# Patient Record
Sex: FEMALE | Race: WHITE | NOT HISPANIC OR LATINO | URBAN - NONMETROPOLITAN AREA
[De-identification: names, ages, dates, MRNs, and addresses within clinical notes are randomized per-mention and may not be internally consistent; named-entity substitution may affect disease eponyms.]

---

## 2019-10-05 ENCOUNTER — IMPORTED ENCOUNTER (OUTPATIENT)
Dept: URBAN - NONMETROPOLITAN AREA CLINIC 1 | Facility: CLINIC | Age: 79
End: 2019-10-05

## 2019-10-05 PROCEDURE — 99203 OFFICE O/P NEW LOW 30 MIN: CPT

## 2019-10-05 NOTE — PATIENT DISCUSSION
Herpes Zoster Keratitis OD- Recommend continue Valcyclovir without chnage and begin Viroptic 1 gtt TID OD- Recommend follow-up in 3 days.

## 2019-10-08 ENCOUNTER — IMPORTED ENCOUNTER (OUTPATIENT)
Dept: URBAN - NONMETROPOLITAN AREA CLINIC 1 | Facility: CLINIC | Age: 79
End: 2019-10-08

## 2019-10-08 PROBLEM — B02.33: Noted: 2019-10-08

## 2019-10-08 PROCEDURE — 99212 OFFICE O/P EST SF 10 MIN: CPT

## 2019-10-08 NOTE — PATIENT DISCUSSION
Herpes Zoster Keratitis OD- Slight improvement from from saturday exam recommend continue valcyclovir  1 PO TID and Viroptic 1 gtt q 4hrs. - Recommend follow-up in 2 days.

## 2019-10-10 ENCOUNTER — IMPORTED ENCOUNTER (OUTPATIENT)
Dept: URBAN - NONMETROPOLITAN AREA CLINIC 1 | Facility: CLINIC | Age: 79
End: 2019-10-10

## 2019-10-10 PROCEDURE — 99212 OFFICE O/P EST SF 10 MIN: CPT

## 2019-10-10 NOTE — PATIENT DISCUSSION
Herpes Zoster Keratitis OD- Recommend continue Valcyclovir without chnage and Viroptic 1 gtt TID OD- Recommend follow-up in 1 week.

## 2019-10-16 ENCOUNTER — IMPORTED ENCOUNTER (OUTPATIENT)
Dept: URBAN - NONMETROPOLITAN AREA CLINIC 1 | Facility: CLINIC | Age: 79
End: 2019-10-16

## 2019-10-16 PROCEDURE — 99212 OFFICE O/P EST SF 10 MIN: CPT

## 2019-10-30 ENCOUNTER — IMPORTED ENCOUNTER (OUTPATIENT)
Dept: URBAN - NONMETROPOLITAN AREA CLINIC 1 | Facility: CLINIC | Age: 79
End: 2019-10-30

## 2019-10-30 PROCEDURE — 99212 OFFICE O/P EST SF 10 MIN: CPT

## 2019-10-30 NOTE — PATIENT DISCUSSION
Herpes Zoster Keratitis OD- Recommend continue Valcyclovir without change and continue Viroptic 1 gtt TID OD.- Recommend follow-up in 3 weeks.

## 2019-11-20 ENCOUNTER — IMPORTED ENCOUNTER (OUTPATIENT)
Dept: URBAN - NONMETROPOLITAN AREA CLINIC 1 | Facility: CLINIC | Age: 79
End: 2019-11-20

## 2019-11-20 PROCEDURE — 99212 OFFICE O/P EST SF 10 MIN: CPT

## 2019-11-20 NOTE — PATIENT DISCUSSION
Herpes Zoster Keratitis OD- Recommend continue Valcyclovir 500 mg BID and continue Viroptic 1 gtt TID OD due to persistent and active zoster.- Recommend follow-up in 1 month.***; 's Notes: Send Viroptic to [facility] send valcyclovir to [facility] in [place]

## 2019-12-19 ENCOUNTER — IMPORTED ENCOUNTER (OUTPATIENT)
Dept: URBAN - NONMETROPOLITAN AREA CLINIC 1 | Facility: CLINIC | Age: 79
End: 2019-12-19

## 2019-12-19 PROCEDURE — 99212 OFFICE O/P EST SF 10 MIN: CPT

## 2019-12-19 NOTE — PATIENT DISCUSSION
Herpes Zoster Keratitis OD- Recommend continue Valcyclovir 500 mg BID and D/C Viroptic.- Recommend follow-up in 1 month.; 's Notes: Send Viroptic to [facility] send valcyclovir to [facility] in [place]

## 2020-01-21 ENCOUNTER — IMPORTED ENCOUNTER (OUTPATIENT)
Dept: URBAN - NONMETROPOLITAN AREA CLINIC 1 | Facility: CLINIC | Age: 80
End: 2020-01-21

## 2020-01-21 PROCEDURE — 99212 OFFICE O/P EST SF 10 MIN: CPT

## 2020-01-21 NOTE — PATIENT DISCUSSION
Stable (but not fully resolved) Herpes Zoster Keratitis OD- Recommend continue Valcyclovir 500 mg BID.- Dr. Dez Leiva will consult Dr. Jayne Nair and question topicl steriod to be effective?- Recommend follow-up in 2 month.; 's Notes: Send Viroptic to [facility] send valcyclovir to [facility] in [place]

## 2022-04-10 ASSESSMENT — VISUAL ACUITY
OS_CC: 20/25
OS_CC: 20/25
OD_PH: 20/25-2
OD_CC: 20/25-2
OD_CC: 20/80-2
OD_CC: 20/30
OS_CC: 20/25-1
OS_CC: 20/25
OD_CC: 20/50-
OS_CC: 20/25
OS_CC: J2
OD_CC: 20/40-
OS_CC: 20/25
OD_CC: 20/50-
OD_CC: 20/30-2
OD_PH: 20/25-